# Patient Record
Sex: FEMALE | Race: WHITE | NOT HISPANIC OR LATINO | URBAN - METROPOLITAN AREA
[De-identification: names, ages, dates, MRNs, and addresses within clinical notes are randomized per-mention and may not be internally consistent; named-entity substitution may affect disease eponyms.]

---

## 2019-02-05 ENCOUNTER — OFFICE VISIT (OUTPATIENT)
Dept: URBAN - METROPOLITAN AREA CLINIC 29 | Facility: CLINIC | Age: 64
End: 2019-02-05
Payer: MEDICAID

## 2019-02-05 PROCEDURE — 99214 OFFICE O/P EST MOD 30 MIN: CPT | Performed by: OPHTHALMOLOGY

## 2019-02-05 PROCEDURE — 92133 CPTRZD OPH DX IMG PST SGM ON: CPT | Performed by: OPHTHALMOLOGY

## 2019-02-05 RX ORDER — LATANOPROST 50 UG/ML
0.005 % SOLUTION OPHTHALMIC
Qty: 1 | Refills: 10 | Status: INACTIVE
Start: 2019-02-05 | End: 2020-01-27

## 2019-02-05 RX ORDER — DORZOLAMIDE HCL 20 MG/ML
2 % SOLUTION/ DROPS OPHTHALMIC
Qty: 1 | Refills: 11 | Status: INACTIVE
Start: 2019-02-05 | End: 2020-02-07

## 2019-02-05 RX ORDER — BRIMONIDINE TARTRATE 1.5 MG/ML
0.15 % SOLUTION OPHTHALMIC
Qty: 1 | Refills: 0 | Status: INACTIVE
Start: 2019-02-05 | End: 2019-03-05

## 2019-02-05 ASSESSMENT — INTRAOCULAR PRESSURE
OS: 21
OD: 22

## 2019-02-05 NOTE — IMPRESSION/PLAN
Impression: Primary open-angle glaucoma, bilateral, moderate stage: A23.0509. OU.
CCT thick OU--S/P  ALT OD 1/31/18 and ALT OS 2/15/18 ONH stable OU- IOP borderline ou - Plan: Discussed diagnosis, explained and understood by patient. Discussed IOP/ONH/Glaucoma management and risks. OCT ordered, performed and reviewed. Continue simbrinza bid ou (brimonidine bid ou and dorzolamide bid ou). start latanoprost ou qhs. Discussed side effects of glaucoma meds. Will continue to monitor pressure.

## 2019-03-04 ENCOUNTER — TESTING ONLY (OUTPATIENT)
Dept: URBAN - METROPOLITAN AREA CLINIC 29 | Facility: CLINIC | Age: 64
End: 2019-03-04
Payer: MEDICAID

## 2019-03-04 PROCEDURE — 92083 EXTENDED VISUAL FIELD XM: CPT | Performed by: OPHTHALMOLOGY

## 2019-03-05 ENCOUNTER — OFFICE VISIT (OUTPATIENT)
Dept: URBAN - METROPOLITAN AREA CLINIC 29 | Facility: CLINIC | Age: 64
End: 2019-03-05
Payer: MEDICAID

## 2019-03-05 PROCEDURE — 99213 OFFICE O/P EST LOW 20 MIN: CPT | Performed by: OPHTHALMOLOGY

## 2019-03-05 RX ORDER — BRIMONIDINE TARTRATE 1.5 MG/ML
0.15 % SOLUTION OPHTHALMIC
Qty: 1 | Refills: 11 | Status: INACTIVE
Start: 2019-03-05 | End: 2019-11-18

## 2019-03-05 ASSESSMENT — INTRAOCULAR PRESSURE
OD: 15
OS: 15

## 2019-03-05 NOTE — IMPRESSION/PLAN
Impression: Primary open-angle glaucoma, bilateral, moderate stage: N34.3140. OU.
CCT thick OU--S/P  ALT OD 1/31/18 and ALT OS 2/15/18 Juan [place] 75 stable OU- IOP doing well ou - Plan: Discussed diagnosis, explained and understood by patient. Discussed IOP/ONH/Glaucoma management and risks. Reviewed visual field results with patient. (brimonidine bid ou and dorzolamide bid ou). continue latanoprost ou qhs. Will continue to monitor pressure.

## 2019-07-09 ENCOUNTER — OFFICE VISIT (OUTPATIENT)
Dept: URBAN - METROPOLITAN AREA CLINIC 29 | Facility: CLINIC | Age: 64
End: 2019-07-09
Payer: MEDICAID

## 2019-07-09 DIAGNOSIS — H40.1132 PRIMARY OPEN-ANGLE GLAUCOMA, BILATERAL, MODERATE STAGE: Primary | ICD-10-CM

## 2019-07-09 PROCEDURE — 99213 OFFICE O/P EST LOW 20 MIN: CPT | Performed by: OPHTHALMOLOGY

## 2019-07-09 ASSESSMENT — INTRAOCULAR PRESSURE
OS: 13
OD: 12

## 2019-07-09 NOTE — IMPRESSION/PLAN
Impression: Primary open-angle glaucoma, bilateral, moderate stage: I27.7939. OU.
CCT thick OU ALT OD 1/31/18 ALT OS 2/15/18 ONH/IOP stable OU Plan: Discussed diagnosis, explained and understood by patient. Discussed IOP/ONH/Glaucoma management and risks. Continue latanoprost ou qhs, dorzolamide bid ou and brimonidine bid ou. Will continue to monitor pressure.

## 2019-11-18 ENCOUNTER — OFFICE VISIT (OUTPATIENT)
Dept: URBAN - METROPOLITAN AREA CLINIC 29 | Facility: CLINIC | Age: 64
End: 2019-11-18
Payer: MEDICAID

## 2019-11-18 PROCEDURE — 99213 OFFICE O/P EST LOW 20 MIN: CPT | Performed by: OPTOMETRIST

## 2019-11-18 ASSESSMENT — INTRAOCULAR PRESSURE
OS: 14
OD: 15

## 2019-11-18 NOTE — IMPRESSION/PLAN
Impression: Primary open-angle glaucoma, bilateral, moderate stage: J58.9943. OU.
CCT thick OU ALT OD 1/31/18 ALT OS 2/15/18 ONH/IOP stable OU Plan: IOP stable today OU. Continue latanoprost ou qhs, dorzolamide bid ou and brimonidine bid ou. RTC 4 months x DFE.

## 2021-06-03 ENCOUNTER — OFFICE VISIT (OUTPATIENT)
Dept: URBAN - METROPOLITAN AREA CLINIC 29 | Facility: CLINIC | Age: 66
End: 2021-06-03
Payer: MEDICAID

## 2021-06-03 PROCEDURE — 92133 CPTRZD OPH DX IMG PST SGM ON: CPT | Performed by: OPTOMETRIST

## 2021-06-03 PROCEDURE — 92014 COMPRE OPH EXAM EST PT 1/>: CPT | Performed by: OPTOMETRIST

## 2021-06-03 RX ORDER — LATANOPROST 50 UG/ML
0.005 % SOLUTION OPHTHALMIC
Qty: 7.5 | Refills: 4 | Status: ACTIVE
Start: 2021-06-03

## 2021-06-03 RX ORDER — DORZOLAMIDE HCL 20 MG/ML
2 % SOLUTION/ DROPS OPHTHALMIC
Qty: 10 | Refills: 11 | Status: INACTIVE
Start: 2021-06-03 | End: 2021-06-21

## 2021-06-03 RX ORDER — BRIMONIDINE TARTRATE 1.5 MG/ML
0.15 % SOLUTION OPHTHALMIC
Qty: 5 | Refills: 11 | Status: ACTIVE
Start: 2021-06-03

## 2021-06-03 ASSESSMENT — INTRAOCULAR PRESSURE
OD: 22
OS: 23

## 2021-06-03 NOTE — IMPRESSION/PLAN
Impression: Primary open-angle glaucoma, bilateral, severe stage: I25.0334. Plan: Discussed diagnosis in detail with patient. Discussed treatment options with patient. No change to current treatment. Will continue to observe condition and or symptoms. Continue using current medication(s), Brimonidine BID OU, Dorzolamide BID OU & Latanoprost QHS OU. Medication refill given today. Emphasized and explained compliance.

## 2021-06-18 ENCOUNTER — TESTING ONLY (OUTPATIENT)
Dept: URBAN - METROPOLITAN AREA CLINIC 29 | Facility: CLINIC | Age: 66
End: 2021-06-18
Payer: MEDICAID

## 2021-06-18 DIAGNOSIS — H40.1133 PRIMARY OPEN-ANGLE GLAUCOMA, BILATERAL, SEVERE STAGE: Primary | ICD-10-CM

## 2021-06-18 PROCEDURE — 92083 EXTENDED VISUAL FIELD XM: CPT | Performed by: OPHTHALMOLOGY

## 2022-04-13 ENCOUNTER — OFFICE VISIT (OUTPATIENT)
Dept: URBAN - METROPOLITAN AREA CLINIC 23 | Facility: CLINIC | Age: 67
End: 2022-04-13
Payer: MEDICAID

## 2022-04-13 DIAGNOSIS — H40.1133 PRIMARY OPEN-ANGLE GLAUCOMA, BILATERAL, SEVERE STAGE: Primary | ICD-10-CM

## 2022-04-13 PROCEDURE — 92014 COMPRE OPH EXAM EST PT 1/>: CPT | Performed by: OPTOMETRIST

## 2022-04-13 PROCEDURE — 92133 CPTRZD OPH DX IMG PST SGM ON: CPT | Performed by: OPTOMETRIST

## 2022-04-13 RX ORDER — LATANOPROST 50 UG/ML
0.005 % SOLUTION OPHTHALMIC
Qty: 7.5 | Refills: 3 | Status: ACTIVE
Start: 2022-04-13

## 2022-04-13 RX ORDER — DORZOLAMIDE HCL 20 MG/ML
2 % SOLUTION/ DROPS OPHTHALMIC
Qty: 30 | Refills: 3 | Status: ACTIVE
Start: 2022-04-13

## 2022-04-13 RX ORDER — BRIMONIDINE TARTRATE 1.5 MG/ML
0.15 % SOLUTION OPHTHALMIC
Qty: 30 | Refills: 3 | Status: ACTIVE
Start: 2022-04-13

## 2022-04-13 NOTE — IMPRESSION/PLAN
Impression: Primary open-angle glaucoma, bilateral, severe stage: H40.1133 Bilateral. Condition: unstable. Plan: Pt has Glaucoma         Pachs:  108/860    Today's IOP :   15/15      Tmax  : 31/29 Target IOP low to mid teens Pt denies Fhx of Glaucoma Right eye is the better seeing eye VF: 06/18/2021 C/D: .8 round OU
OCT: 06/03/2021 DE: 06/03/2021 Pt denies Sulfa Allergy   // Pt denies Lung /Heart dx Plan :
1. Continue, refill sent Brimonidine BID OU Dorzolamide BID OU Latanoprost QHS OU
2. IOP at goal but patient reports it is difficult to afford drops. Discussed treatment options, will have [address] [place] [address] team reach out to pt if she is candidate for study. Will submit cost assistance paperwork for patient as needed. 3. RNFL OCT ordered and reviewed, stable.

## 2022-04-28 ENCOUNTER — OFFICE VISIT (OUTPATIENT)
Dept: URBAN - METROPOLITAN AREA CLINIC 23 | Facility: CLINIC | Age: 67
End: 2022-04-28
Payer: MEDICAID

## 2022-04-28 DIAGNOSIS — H52.13 MYOPIA, BILATERAL: Primary | ICD-10-CM

## 2022-04-28 ASSESSMENT — VISUAL ACUITY
OD: 20/20
OS: 20/20

## 2022-04-28 ASSESSMENT — KERATOMETRY
OD: 42.00
OS: 42.75